# Patient Record
Sex: FEMALE | Race: WHITE | NOT HISPANIC OR LATINO
[De-identification: names, ages, dates, MRNs, and addresses within clinical notes are randomized per-mention and may not be internally consistent; named-entity substitution may affect disease eponyms.]

---

## 2017-09-07 ENCOUNTER — APPOINTMENT (OUTPATIENT)
Dept: ANTEPARTUM | Facility: CLINIC | Age: 37
End: 2017-09-07
Payer: COMMERCIAL

## 2017-09-07 ENCOUNTER — ASOB RESULT (OUTPATIENT)
Age: 37
End: 2017-09-07

## 2017-09-07 PROCEDURE — 99241 OFFICE CONSULTATION NEW/ESTAB PATIENT 15 MIN: CPT | Mod: 25

## 2017-09-07 PROCEDURE — 76817 TRANSVAGINAL US OBSTETRIC: CPT

## 2017-09-07 PROCEDURE — 76811 OB US DETAILED SNGL FETUS: CPT

## 2017-09-22 ENCOUNTER — ASOB RESULT (OUTPATIENT)
Age: 37
End: 2017-09-22

## 2017-09-22 ENCOUNTER — APPOINTMENT (OUTPATIENT)
Dept: ANTEPARTUM | Facility: CLINIC | Age: 37
End: 2017-09-22
Payer: COMMERCIAL

## 2017-09-22 PROCEDURE — 76816 OB US FOLLOW-UP PER FETUS: CPT

## 2017-09-22 PROCEDURE — 76817 TRANSVAGINAL US OBSTETRIC: CPT

## 2017-11-07 ENCOUNTER — APPOINTMENT (OUTPATIENT)
Dept: MATERNAL FETAL MEDICINE | Facility: CLINIC | Age: 37
End: 2017-11-07
Payer: COMMERCIAL

## 2017-11-07 DIAGNOSIS — O99.810 ABNORMAL GLUCOSE COMPLICATING PREGNANCY: ICD-10-CM

## 2017-11-07 PROCEDURE — G0109 DIAB MANAGE TRN IND/GROUP: CPT

## 2017-11-08 ENCOUNTER — MEDICATION RENEWAL (OUTPATIENT)
Age: 37
End: 2017-11-08

## 2017-11-08 ENCOUNTER — APPOINTMENT (OUTPATIENT)
Dept: MATERNAL FETAL MEDICINE | Facility: CLINIC | Age: 37
End: 2017-11-08
Payer: COMMERCIAL

## 2017-11-08 PROCEDURE — G0109 DIAB MANAGE TRN IND/GROUP: CPT

## 2017-11-08 RX ORDER — URINE ACETONE TEST STRIPS
STRIP MISCELLANEOUS
Qty: 1 | Refills: 1 | Status: ACTIVE | COMMUNITY
Start: 2017-11-07 | End: 1900-01-01

## 2017-11-15 ENCOUNTER — APPOINTMENT (OUTPATIENT)
Dept: MATERNAL FETAL MEDICINE | Facility: CLINIC | Age: 37
End: 2017-11-15
Payer: COMMERCIAL

## 2017-11-15 DIAGNOSIS — O24.414 GESTATIONAL DIABETES MELLITUS IN PREGNANCY, INSULIN CONTROLLED: ICD-10-CM

## 2017-11-15 PROCEDURE — G0108 DIAB MANAGE TRN  PER INDIV: CPT

## 2017-11-15 RX ORDER — ISOPROPYL ALCOHOL 70 ML/100ML
SWAB TOPICAL
Qty: 1 | Refills: 0 | Status: ACTIVE | COMMUNITY
Start: 2017-11-15 | End: 1900-01-01

## 2017-11-15 RX ORDER — INSULIN HUMAN 100 [IU]/ML
100 INJECTION, SUSPENSION SUBCUTANEOUS
Qty: 2 | Refills: 3 | Status: ACTIVE | COMMUNITY
Start: 2017-11-15 | End: 1900-01-01

## 2017-11-15 RX ORDER — PEN NEEDLE, DIABETIC 29 G X1/2"
32G X 4 MM NEEDLE, DISPOSABLE MISCELLANEOUS
Qty: 1 | Refills: 0 | Status: ACTIVE | COMMUNITY
Start: 2017-11-15 | End: 1900-01-01

## 2017-11-15 RX ORDER — INSULIN HUMAN 100 [IU]/ML
100 INJECTION, SUSPENSION SUBCUTANEOUS
Qty: 1 | Refills: 1 | Status: ACTIVE | COMMUNITY
Start: 2017-11-15 | End: 1900-01-01

## 2017-11-15 RX ORDER — SYRING-NEEDL,DISP,INSUL,0.3 ML 31GX15/64"
31G X 15/64" SYRINGE, EMPTY DISPOSABLE MISCELLANEOUS
Qty: 1 | Refills: 1 | Status: ACTIVE | COMMUNITY
Start: 2017-11-15 | End: 1900-01-01

## 2017-11-24 ENCOUNTER — OUTPATIENT (OUTPATIENT)
Dept: OUTPATIENT SERVICES | Facility: HOSPITAL | Age: 37
LOS: 1 days | End: 2017-11-24
Payer: COMMERCIAL

## 2017-11-24 DIAGNOSIS — Z3A.00 WEEKS OF GESTATION OF PREGNANCY NOT SPECIFIED: ICD-10-CM

## 2017-11-24 DIAGNOSIS — O26.899 OTHER SPECIFIED PREGNANCY RELATED CONDITIONS, UNSPECIFIED TRIMESTER: ICD-10-CM

## 2017-11-24 LAB
APPEARANCE UR: CLEAR — SIGNIFICANT CHANGE UP
BILIRUB UR-MCNC: NEGATIVE — SIGNIFICANT CHANGE UP
COLOR SPEC: SIGNIFICANT CHANGE UP
DIFF PNL FLD: NEGATIVE — SIGNIFICANT CHANGE UP
GLUCOSE BLDC GLUCOMTR-MCNC: 94 MG/DL — SIGNIFICANT CHANGE UP (ref 70–99)
GLUCOSE UR QL: NEGATIVE — SIGNIFICANT CHANGE UP
KETONES UR-MCNC: ABNORMAL
LEUKOCYTE ESTERASE UR-ACNC: NEGATIVE — SIGNIFICANT CHANGE UP
NITRITE UR-MCNC: NEGATIVE — SIGNIFICANT CHANGE UP
PH UR: 6.5 — SIGNIFICANT CHANGE UP (ref 5–8)
PROT UR-MCNC: NEGATIVE — SIGNIFICANT CHANGE UP
SP GR SPEC: 1.01 — SIGNIFICANT CHANGE UP (ref 1.01–1.02)
UROBILINOGEN FLD QL: NEGATIVE — SIGNIFICANT CHANGE UP

## 2017-11-24 PROCEDURE — G0463: CPT

## 2017-11-24 PROCEDURE — 59025 FETAL NON-STRESS TEST: CPT

## 2017-11-24 PROCEDURE — 82962 GLUCOSE BLOOD TEST: CPT

## 2017-11-24 PROCEDURE — 81003 URINALYSIS AUTO W/O SCOPE: CPT

## 2017-11-24 RX ORDER — SODIUM CHLORIDE 9 MG/ML
1000 INJECTION, SOLUTION INTRAVENOUS ONCE
Qty: 0 | Refills: 0 | Status: COMPLETED | OUTPATIENT
Start: 2017-11-24 | End: 2017-11-24

## 2017-11-24 RX ADMIN — SODIUM CHLORIDE 1000 MILLILITER(S): 9 INJECTION, SOLUTION INTRAVENOUS at 18:00

## 2017-11-24 RX ADMIN — Medication 0.25 MILLIGRAM(S): at 18:18

## 2017-11-29 ENCOUNTER — RX RENEWAL (OUTPATIENT)
Age: 37
End: 2017-11-29

## 2017-11-29 RX ORDER — BLOOD SUGAR DIAGNOSTIC
STRIP MISCELLANEOUS
Qty: 200 | Refills: 2 | Status: ACTIVE | COMMUNITY
Start: 2017-11-07 | End: 1900-01-01

## 2017-11-30 ENCOUNTER — OTHER (OUTPATIENT)
Age: 37
End: 2017-11-30

## 2017-11-30 ENCOUNTER — APPOINTMENT (OUTPATIENT)
Dept: MATERNAL FETAL MEDICINE | Facility: CLINIC | Age: 37
End: 2017-11-30

## 2017-12-14 ENCOUNTER — INPATIENT (INPATIENT)
Facility: HOSPITAL | Age: 37
LOS: 4 days | Discharge: HOME | End: 2017-12-19
Attending: OBSTETRICS & GYNECOLOGY | Admitting: OBSTETRICS & GYNECOLOGY
Payer: COMMERCIAL

## 2017-12-14 VITALS — HEIGHT: 60 IN | WEIGHT: 149.91 LBS

## 2017-12-14 DIAGNOSIS — O26.899 OTHER SPECIFIED PREGNANCY RELATED CONDITIONS, UNSPECIFIED TRIMESTER: ICD-10-CM

## 2017-12-14 DIAGNOSIS — Z34.80 ENCOUNTER FOR SUPERVISION OF OTHER NORMAL PREGNANCY, UNSPECIFIED TRIMESTER: ICD-10-CM

## 2017-12-14 DIAGNOSIS — Z3A.00 WEEKS OF GESTATION OF PREGNANCY NOT SPECIFIED: ICD-10-CM

## 2017-12-14 LAB
BASOPHILS # BLD AUTO: 0.1 K/UL — SIGNIFICANT CHANGE UP (ref 0–0.2)
BASOPHILS NFR BLD AUTO: 0.4 % — SIGNIFICANT CHANGE UP (ref 0–2)
BLD GP AB SCN SERPL QL: NEGATIVE — SIGNIFICANT CHANGE UP
EOSINOPHIL # BLD AUTO: 0.2 K/UL — SIGNIFICANT CHANGE UP (ref 0–0.5)
EOSINOPHIL NFR BLD AUTO: 1.4 % — SIGNIFICANT CHANGE UP (ref 0–6)
GLUCOSE BLDC GLUCOMTR-MCNC: 113 MG/DL — HIGH (ref 70–99)
GLUCOSE BLDC GLUCOMTR-MCNC: 117 MG/DL — HIGH (ref 70–99)
HCT VFR BLD CALC: 34.4 % — LOW (ref 34.5–45)
HGB BLD-MCNC: 12.3 G/DL — SIGNIFICANT CHANGE UP (ref 11.5–15.5)
LYMPHOCYTES # BLD AUTO: 17.2 % — SIGNIFICANT CHANGE UP (ref 13–44)
LYMPHOCYTES # BLD AUTO: 2 K/UL — SIGNIFICANT CHANGE UP (ref 1–3.3)
MCHC RBC-ENTMCNC: 33.5 PG — SIGNIFICANT CHANGE UP (ref 27–34)
MCHC RBC-ENTMCNC: 35.7 GM/DL — SIGNIFICANT CHANGE UP (ref 32–36)
MCV RBC AUTO: 93.8 FL — SIGNIFICANT CHANGE UP (ref 80–100)
MONOCYTES # BLD AUTO: 0.9 K/UL — SIGNIFICANT CHANGE UP (ref 0–0.9)
MONOCYTES NFR BLD AUTO: 7.6 % — SIGNIFICANT CHANGE UP (ref 2–14)
NEUTROPHILS # BLD AUTO: 8.4 K/UL — HIGH (ref 1.8–7.4)
NEUTROPHILS NFR BLD AUTO: 73.4 % — SIGNIFICANT CHANGE UP (ref 43–77)
PLATELET # BLD AUTO: 188 K/UL — SIGNIFICANT CHANGE UP (ref 150–400)
RBC # BLD: 3.67 M/UL — LOW (ref 3.8–5.2)
RBC # FLD: 12.5 % — SIGNIFICANT CHANGE UP (ref 10.3–14.5)
RH IG SCN BLD-IMP: POSITIVE — SIGNIFICANT CHANGE UP
WBC # BLD: 11.5 K/UL — HIGH (ref 3.8–10.5)
WBC # FLD AUTO: 11.5 K/UL — HIGH (ref 3.8–10.5)

## 2017-12-14 RX ORDER — SODIUM CHLORIDE 9 MG/ML
500 INJECTION INTRAMUSCULAR; INTRAVENOUS; SUBCUTANEOUS ONCE
Qty: 0 | Refills: 0 | Status: COMPLETED | OUTPATIENT
Start: 2017-12-14 | End: 2017-12-14

## 2017-12-14 RX ORDER — AZITHROMYCIN 500 MG/1
1000 TABLET, FILM COATED ORAL ONCE
Qty: 0 | Refills: 0 | Status: COMPLETED | OUTPATIENT
Start: 2017-12-14 | End: 2017-12-14

## 2017-12-14 RX ORDER — MAGNESIUM SULFATE 500 MG/ML
2 VIAL (ML) INJECTION
Qty: 40 | Refills: 0 | Status: DISCONTINUED | OUTPATIENT
Start: 2017-12-14 | End: 2017-12-15

## 2017-12-14 RX ORDER — SODIUM CHLORIDE 9 MG/ML
1000 INJECTION INTRAMUSCULAR; INTRAVENOUS; SUBCUTANEOUS
Qty: 0 | Refills: 0 | Status: DISCONTINUED | OUTPATIENT
Start: 2017-12-14 | End: 2017-12-15

## 2017-12-14 RX ORDER — AMPICILLIN TRIHYDRATE 250 MG
CAPSULE ORAL
Qty: 0 | Refills: 0 | Status: DISCONTINUED | OUTPATIENT
Start: 2017-12-14 | End: 2017-12-15

## 2017-12-14 RX ORDER — AMPICILLIN TRIHYDRATE 250 MG
2 CAPSULE ORAL ONCE
Qty: 0 | Refills: 0 | Status: COMPLETED | OUTPATIENT
Start: 2017-12-14 | End: 2017-12-14

## 2017-12-14 RX ORDER — MAGNESIUM SULFATE 500 MG/ML
4 VIAL (ML) INJECTION ONCE
Qty: 0 | Refills: 0 | Status: COMPLETED | OUTPATIENT
Start: 2017-12-14 | End: 2017-12-14

## 2017-12-14 RX ORDER — DEXTROSE 50 % IN WATER 50 %
12.5 SYRINGE (ML) INTRAVENOUS ONCE
Qty: 0 | Refills: 0 | Status: DISCONTINUED | OUTPATIENT
Start: 2017-12-14 | End: 2017-12-15

## 2017-12-14 RX ORDER — DEXTROSE 50 % IN WATER 50 %
25 SYRINGE (ML) INTRAVENOUS ONCE
Qty: 0 | Refills: 0 | Status: DISCONTINUED | OUTPATIENT
Start: 2017-12-14 | End: 2017-12-15

## 2017-12-14 RX ORDER — HUMAN INSULIN 100 [IU]/ML
32 INJECTION, SUSPENSION SUBCUTANEOUS AT BEDTIME
Qty: 0 | Refills: 0 | Status: DISCONTINUED | OUTPATIENT
Start: 2017-12-14 | End: 2017-12-15

## 2017-12-14 RX ORDER — AMPICILLIN TRIHYDRATE 250 MG
2 CAPSULE ORAL EVERY 6 HOURS
Qty: 0 | Refills: 0 | Status: DISCONTINUED | OUTPATIENT
Start: 2017-12-15 | End: 2017-12-15

## 2017-12-14 RX ORDER — SODIUM CHLORIDE 0.65 %
1 AEROSOL, SPRAY (ML) NASAL DAILY
Qty: 0 | Refills: 0 | Status: DISCONTINUED | OUTPATIENT
Start: 2017-12-14 | End: 2017-12-19

## 2017-12-14 RX ORDER — DEXTROSE 50 % IN WATER 50 %
1 SYRINGE (ML) INTRAVENOUS ONCE
Qty: 0 | Refills: 0 | Status: DISCONTINUED | OUTPATIENT
Start: 2017-12-14 | End: 2017-12-15

## 2017-12-14 RX ORDER — SODIUM CHLORIDE 9 MG/ML
1000 INJECTION, SOLUTION INTRAVENOUS
Qty: 0 | Refills: 0 | Status: DISCONTINUED | OUTPATIENT
Start: 2017-12-14 | End: 2017-12-15

## 2017-12-14 RX ORDER — GLUCAGON INJECTION, SOLUTION 0.5 MG/.1ML
1 INJECTION, SOLUTION SUBCUTANEOUS ONCE
Qty: 0 | Refills: 0 | Status: DISCONTINUED | OUTPATIENT
Start: 2017-12-14 | End: 2017-12-15

## 2017-12-14 RX ADMIN — SODIUM CHLORIDE 1000 MILLILITER(S): 9 INJECTION INTRAMUSCULAR; INTRAVENOUS; SUBCUTANEOUS at 20:30

## 2017-12-14 RX ADMIN — Medication 216 GRAM(S): at 20:44

## 2017-12-14 RX ADMIN — Medication 300 GRAM(S): at 20:27

## 2017-12-14 RX ADMIN — AZITHROMYCIN 1000 MILLIGRAM(S): 500 TABLET, FILM COATED ORAL at 22:41

## 2017-12-14 RX ADMIN — HUMAN INSULIN 32 UNIT(S): 100 INJECTION, SUSPENSION SUBCUTANEOUS at 23:15

## 2017-12-14 RX ADMIN — Medication 1 SPRAY(S): at 22:45

## 2017-12-14 RX ADMIN — Medication 12 MILLIGRAM(S): at 20:21

## 2017-12-14 RX ADMIN — SODIUM CHLORIDE 50 MILLILITER(S): 9 INJECTION INTRAMUSCULAR; INTRAVENOUS; SUBCUTANEOUS at 20:54

## 2017-12-15 ENCOUNTER — RESULT REVIEW (OUTPATIENT)
Age: 37
End: 2017-12-15

## 2017-12-15 ENCOUNTER — APPOINTMENT (OUTPATIENT)
Dept: ANTEPARTUM | Facility: CLINIC | Age: 37
End: 2017-12-15

## 2017-12-15 ENCOUNTER — ASOB RESULT (OUTPATIENT)
Age: 37
End: 2017-12-15

## 2017-12-15 LAB
GLUCOSE BLDC GLUCOMTR-MCNC: 101 MG/DL — HIGH (ref 70–99)
GLUCOSE BLDC GLUCOMTR-MCNC: 113 MG/DL — HIGH (ref 70–99)
GLUCOSE BLDC GLUCOMTR-MCNC: 129 MG/DL — HIGH (ref 70–99)
GLUCOSE BLDC GLUCOMTR-MCNC: 95 MG/DL — SIGNIFICANT CHANGE UP (ref 70–99)
MAGNESIUM SERPL-MCNC: 4.8 MG/DL — HIGH (ref 1.6–2.6)
MAGNESIUM SERPL-MCNC: 6 MG/DL — HIGH (ref 1.6–2.6)
MAGNESIUM SERPL-MCNC: 6.7 MG/DL — HIGH (ref 1.6–2.6)
T PALLIDUM AB TITR SER: NEGATIVE — SIGNIFICANT CHANGE UP

## 2017-12-15 PROCEDURE — 76819 FETAL BIOPHYS PROFIL W/O NST: CPT | Mod: 26

## 2017-12-15 PROCEDURE — 76816 OB US FOLLOW-UP PER FETUS: CPT | Mod: 26

## 2017-12-15 PROCEDURE — 74000: CPT | Mod: 26

## 2017-12-15 RX ORDER — HYDROMORPHONE HYDROCHLORIDE 2 MG/ML
0.5 INJECTION INTRAMUSCULAR; INTRAVENOUS; SUBCUTANEOUS
Qty: 0 | Refills: 0 | Status: DISCONTINUED | OUTPATIENT
Start: 2017-12-15 | End: 2017-12-17

## 2017-12-15 RX ORDER — OXYCODONE HYDROCHLORIDE 5 MG/1
5 TABLET ORAL
Qty: 0 | Refills: 0 | Status: COMPLETED | OUTPATIENT
Start: 2017-12-15 | End: 2017-12-22

## 2017-12-15 RX ORDER — DOCUSATE SODIUM 100 MG
100 CAPSULE ORAL
Qty: 0 | Refills: 0 | Status: DISCONTINUED | OUTPATIENT
Start: 2017-12-15 | End: 2017-12-16

## 2017-12-15 RX ORDER — SODIUM CHLORIDE 9 MG/ML
1000 INJECTION, SOLUTION INTRAVENOUS
Qty: 0 | Refills: 0 | Status: DISCONTINUED | OUTPATIENT
Start: 2017-12-15 | End: 2017-12-19

## 2017-12-15 RX ORDER — OXYCODONE HYDROCHLORIDE 5 MG/1
5 TABLET ORAL EVERY 4 HOURS
Qty: 0 | Refills: 0 | Status: COMPLETED | OUTPATIENT
Start: 2017-12-15 | End: 2017-12-22

## 2017-12-15 RX ORDER — NALOXONE HYDROCHLORIDE 4 MG/.1ML
0.1 SPRAY NASAL
Qty: 0 | Refills: 0 | Status: DISCONTINUED | OUTPATIENT
Start: 2017-12-15 | End: 2017-12-17

## 2017-12-15 RX ORDER — TETANUS TOXOID, REDUCED DIPHTHERIA TOXOID AND ACELLULAR PERTUSSIS VACCINE, ADSORBED 5; 2.5; 8; 8; 2.5 [IU]/.5ML; [IU]/.5ML; UG/.5ML; UG/.5ML; UG/.5ML
0.5 SUSPENSION INTRAMUSCULAR ONCE
Qty: 0 | Refills: 0 | Status: DISCONTINUED | OUTPATIENT
Start: 2017-12-15 | End: 2017-12-19

## 2017-12-15 RX ORDER — ONDANSETRON 8 MG/1
4 TABLET, FILM COATED ORAL EVERY 6 HOURS
Qty: 0 | Refills: 0 | Status: DISCONTINUED | OUTPATIENT
Start: 2017-12-15 | End: 2017-12-17

## 2017-12-15 RX ORDER — SODIUM CHLORIDE 9 MG/ML
1000 INJECTION, SOLUTION INTRAVENOUS
Qty: 0 | Refills: 0 | Status: DISCONTINUED | OUTPATIENT
Start: 2017-12-15 | End: 2017-12-15

## 2017-12-15 RX ORDER — HEPARIN SODIUM 5000 [USP'U]/ML
5000 INJECTION INTRAVENOUS; SUBCUTANEOUS EVERY 12 HOURS
Qty: 0 | Refills: 0 | Status: DISCONTINUED | OUTPATIENT
Start: 2017-12-15 | End: 2017-12-19

## 2017-12-15 RX ORDER — HYDROMORPHONE HYDROCHLORIDE 2 MG/ML
0.25 INJECTION INTRAMUSCULAR; INTRAVENOUS; SUBCUTANEOUS
Qty: 0 | Refills: 0 | Status: DISCONTINUED | OUTPATIENT
Start: 2017-12-15 | End: 2017-12-19

## 2017-12-15 RX ORDER — OXYTOCIN 10 UNIT/ML
41.67 VIAL (ML) INJECTION
Qty: 20 | Refills: 0 | Status: DISCONTINUED | OUTPATIENT
Start: 2017-12-15 | End: 2017-12-19

## 2017-12-15 RX ORDER — KETOROLAC TROMETHAMINE 30 MG/ML
30 SYRINGE (ML) INJECTION EVERY 6 HOURS
Qty: 0 | Refills: 0 | Status: DISCONTINUED | OUTPATIENT
Start: 2017-12-15 | End: 2017-12-17

## 2017-12-15 RX ORDER — FERROUS SULFATE 325(65) MG
325 TABLET ORAL DAILY
Qty: 0 | Refills: 0 | Status: DISCONTINUED | OUTPATIENT
Start: 2017-12-15 | End: 2017-12-16

## 2017-12-15 RX ORDER — LANOLIN
1 OINTMENT (GRAM) TOPICAL
Qty: 0 | Refills: 0 | Status: DISCONTINUED | OUTPATIENT
Start: 2017-12-15 | End: 2017-12-19

## 2017-12-15 RX ORDER — HYDROMORPHONE HYDROCHLORIDE 2 MG/ML
30 INJECTION INTRAMUSCULAR; INTRAVENOUS; SUBCUTANEOUS
Qty: 0 | Refills: 0 | Status: DISCONTINUED | OUTPATIENT
Start: 2017-12-15 | End: 2017-12-17

## 2017-12-15 RX ORDER — ACETAMINOPHEN 500 MG
975 TABLET ORAL EVERY 6 HOURS
Qty: 0 | Refills: 0 | Status: DISCONTINUED | OUTPATIENT
Start: 2017-12-15 | End: 2017-12-19

## 2017-12-15 RX ORDER — OXYTOCIN 10 UNIT/ML
333.33 VIAL (ML) INJECTION
Qty: 20 | Refills: 0 | Status: DISCONTINUED | OUTPATIENT
Start: 2017-12-15 | End: 2017-12-19

## 2017-12-15 RX ORDER — SIMETHICONE 80 MG/1
80 TABLET, CHEWABLE ORAL EVERY 4 HOURS
Qty: 0 | Refills: 0 | Status: DISCONTINUED | OUTPATIENT
Start: 2017-12-15 | End: 2017-12-19

## 2017-12-15 RX ORDER — DIPHENHYDRAMINE HCL 50 MG
25 CAPSULE ORAL EVERY 6 HOURS
Qty: 0 | Refills: 0 | Status: DISCONTINUED | OUTPATIENT
Start: 2017-12-15 | End: 2017-12-19

## 2017-12-15 RX ORDER — IBUPROFEN 200 MG
600 TABLET ORAL EVERY 6 HOURS
Qty: 0 | Refills: 0 | Status: COMPLETED | OUTPATIENT
Start: 2017-12-15 | End: 2018-11-13

## 2017-12-15 RX ORDER — GLYCERIN ADULT
1 SUPPOSITORY, RECTAL RECTAL AT BEDTIME
Qty: 0 | Refills: 0 | Status: DISCONTINUED | OUTPATIENT
Start: 2017-12-15 | End: 2017-12-19

## 2017-12-15 RX ADMIN — SIMETHICONE 80 MILLIGRAM(S): 80 TABLET, CHEWABLE ORAL at 22:02

## 2017-12-15 RX ADMIN — SODIUM CHLORIDE 125 MILLILITER(S): 9 INJECTION, SOLUTION INTRAVENOUS at 14:05

## 2017-12-15 RX ADMIN — Medication 216 GRAM(S): at 09:09

## 2017-12-15 RX ADMIN — Medication 100 MILLIGRAM(S): at 22:02

## 2017-12-15 RX ADMIN — Medication 30 MILLIGRAM(S): at 23:00

## 2017-12-15 RX ADMIN — HYDROMORPHONE HYDROCHLORIDE 30 MILLILITER(S): 2 INJECTION INTRAMUSCULAR; INTRAVENOUS; SUBCUTANEOUS at 19:35

## 2017-12-15 RX ADMIN — SODIUM CHLORIDE 125 MILLILITER(S): 9 INJECTION, SOLUTION INTRAVENOUS at 21:26

## 2017-12-15 RX ADMIN — Medication 975 MILLIGRAM(S): at 22:01

## 2017-12-15 RX ADMIN — Medication 216 GRAM(S): at 03:14

## 2017-12-15 RX ADMIN — Medication 30 MILLIGRAM(S): at 22:00

## 2017-12-15 RX ADMIN — Medication 975 MILLIGRAM(S): at 23:00

## 2017-12-16 LAB
BASOPHILS # BLD AUTO: 0 K/UL — SIGNIFICANT CHANGE UP (ref 0–0.2)
BASOPHILS NFR BLD AUTO: 0.3 % — SIGNIFICANT CHANGE UP (ref 0–2)
EOSINOPHIL # BLD AUTO: 0 K/UL — SIGNIFICANT CHANGE UP (ref 0–0.5)
EOSINOPHIL NFR BLD AUTO: 0.3 % — SIGNIFICANT CHANGE UP (ref 0–6)
GROUP B BETA STREP DNA (PCR): SIGNIFICANT CHANGE UP
GROUP B BETA STREP INTERPRETATION: SIGNIFICANT CHANGE UP
HCT VFR BLD CALC: 24.9 % — LOW (ref 34.5–45)
HGB BLD-MCNC: 8.7 G/DL — LOW (ref 11.5–15.5)
LYMPHOCYTES # BLD AUTO: 1.3 K/UL — SIGNIFICANT CHANGE UP (ref 1–3.3)
LYMPHOCYTES # BLD AUTO: 10.8 % — LOW (ref 13–44)
MCHC RBC-ENTMCNC: 33.5 PG — SIGNIFICANT CHANGE UP (ref 27–34)
MCHC RBC-ENTMCNC: 35 GM/DL — SIGNIFICANT CHANGE UP (ref 32–36)
MCV RBC AUTO: 95.9 FL — SIGNIFICANT CHANGE UP (ref 80–100)
MONOCYTES # BLD AUTO: 0.8 K/UL — SIGNIFICANT CHANGE UP (ref 0–0.9)
MONOCYTES NFR BLD AUTO: 6.8 % — SIGNIFICANT CHANGE UP (ref 2–14)
NEUTROPHILS # BLD AUTO: 9.7 K/UL — HIGH (ref 1.8–7.4)
NEUTROPHILS NFR BLD AUTO: 81.9 % — HIGH (ref 43–77)
PLATELET # BLD AUTO: 185 K/UL — SIGNIFICANT CHANGE UP (ref 150–400)
RBC # BLD: 2.6 M/UL — LOW (ref 3.8–5.2)
RBC # FLD: 12.8 % — SIGNIFICANT CHANGE UP (ref 10.3–14.5)
SOURCE GROUP B STREP: SIGNIFICANT CHANGE UP
WBC # BLD: 11.9 K/UL — HIGH (ref 3.8–10.5)
WBC # FLD AUTO: 11.9 K/UL — HIGH (ref 3.8–10.5)

## 2017-12-16 RX ORDER — DOCUSATE SODIUM 100 MG
100 CAPSULE ORAL THREE TIMES A DAY
Qty: 0 | Refills: 0 | Status: DISCONTINUED | OUTPATIENT
Start: 2017-12-16 | End: 2017-12-19

## 2017-12-16 RX ORDER — ASCORBIC ACID 60 MG
250 TABLET,CHEWABLE ORAL THREE TIMES A DAY
Qty: 0 | Refills: 0 | Status: DISCONTINUED | OUTPATIENT
Start: 2017-12-16 | End: 2017-12-19

## 2017-12-16 RX ORDER — FERROUS SULFATE 325(65) MG
325 TABLET ORAL
Qty: 0 | Refills: 0 | Status: DISCONTINUED | OUTPATIENT
Start: 2017-12-16 | End: 2017-12-19

## 2017-12-16 RX ORDER — IBUPROFEN 200 MG
600 TABLET ORAL EVERY 6 HOURS
Qty: 0 | Refills: 0 | Status: DISCONTINUED | OUTPATIENT
Start: 2017-12-16 | End: 2017-12-19

## 2017-12-16 RX ADMIN — SIMETHICONE 80 MILLIGRAM(S): 80 TABLET, CHEWABLE ORAL at 14:03

## 2017-12-16 RX ADMIN — Medication 975 MILLIGRAM(S): at 03:52

## 2017-12-16 RX ADMIN — Medication 100 MILLIGRAM(S): at 18:20

## 2017-12-16 RX ADMIN — Medication 975 MILLIGRAM(S): at 18:20

## 2017-12-16 RX ADMIN — Medication 975 MILLIGRAM(S): at 11:22

## 2017-12-16 RX ADMIN — Medication 1 SUPPOSITORY(S): at 22:44

## 2017-12-16 RX ADMIN — Medication 30 MILLIGRAM(S): at 11:25

## 2017-12-16 RX ADMIN — Medication 30 MILLIGRAM(S): at 03:54

## 2017-12-16 RX ADMIN — Medication 100 MILLIGRAM(S): at 22:44

## 2017-12-16 RX ADMIN — HEPARIN SODIUM 5000 UNIT(S): 5000 INJECTION INTRAVENOUS; SUBCUTANEOUS at 18:17

## 2017-12-16 RX ADMIN — Medication 1 TABLET(S): at 11:24

## 2017-12-16 RX ADMIN — Medication 30 MILLIGRAM(S): at 18:19

## 2017-12-16 RX ADMIN — HEPARIN SODIUM 5000 UNIT(S): 5000 INJECTION INTRAVENOUS; SUBCUTANEOUS at 03:54

## 2017-12-16 RX ADMIN — Medication 975 MILLIGRAM(S): at 05:06

## 2017-12-16 RX ADMIN — SIMETHICONE 80 MILLIGRAM(S): 80 TABLET, CHEWABLE ORAL at 18:23

## 2017-12-16 RX ADMIN — Medication 325 MILLIGRAM(S): at 11:23

## 2017-12-16 RX ADMIN — Medication 30 MILLIGRAM(S): at 11:24

## 2017-12-16 RX ADMIN — Medication 100 MILLIGRAM(S): at 11:25

## 2017-12-16 RX ADMIN — Medication 30 MILLIGRAM(S): at 05:06

## 2017-12-16 RX ADMIN — Medication 250 MILLIGRAM(S): at 22:44

## 2017-12-16 NOTE — DIETITIAN INITIAL EVALUATION ADULT. - NS AS NUTRI INTERV ED CONTENT
Educated patient on need to follow-up for 2-hour oral glucose tolerance test 4-12 weeks after delivery. Advised patient that she no longer needs to check fingersticks at home but that she is at increased risk for developing T2DM due to GDM. Encouraged gradual wt loss with daily physical activity when medically feasible. Explained that breastfeeding decreases the risk for developing T2DM. Pt encouraged to continue prenatal MVI while breastfeeding and that she will require more protein/calories while breastfeeding. Encouraged pt to liberalize diet but continue to include protein with carbohydrates during meals/snacks.

## 2017-12-16 NOTE — DIETITIAN INITIAL EVALUATION ADULT. - ADHERENCE
good/Patient diagnosed with GDM during this pregnancy, stated she was not diagnosed with GDM during previous pregnancies. Reported taking 16units Novolin @ bed for BG management and was checking fingersticks daily.

## 2017-12-16 NOTE — DIETITIAN INITIAL EVALUATION ADULT. - PERTINENT MEDS FT
lactated ringers @ 125cc/hr, colace, ferrous sulfate, glycerin, dilaudid, zofran, oxycodone, prenatal MVI, mylicon

## 2017-12-16 NOTE — DIETITIAN INITIAL EVALUATION ADULT. - OTHER INFO
Nutrition consult received for GDM. [id number] female who delivered at 33.5 weeks gestation, infant currently in NICU. Patient taking prenatal MVI during pregnancy. Patient plans to exclusively , encouraged continuation of prenatal MVI while breastfeeding. Followed by maternal fetal medicine during pregnancy. Reports good appetite & PO intake during admission. NKFA noted. No BM since admission

## 2017-12-16 NOTE — DIETITIAN INITIAL EVALUATION ADULT. - ENERGY NEEDS
Ht 60 inches Pre-Pregnancy Wt 135 pounds BMI 26.4 Kg/m^2   pounds +/- 10%; 135% IBW  No edema, skin intact

## 2017-12-16 NOTE — DIETITIAN INITIAL EVALUATION ADULT. - NS FNS REASON FOR WEIGHT CHANG
Patient reported pre-pregnancy weight of 135 pounds with weight gain of 17 pounds to current weight of 152 pounds during pregnancy

## 2017-12-17 RX ORDER — MAGNESIUM HYDROXIDE 400 MG/1
30 TABLET, CHEWABLE ORAL DAILY
Qty: 0 | Refills: 0 | Status: DISCONTINUED | OUTPATIENT
Start: 2017-12-17 | End: 2017-12-19

## 2017-12-17 RX ORDER — OXYCODONE HYDROCHLORIDE 5 MG/1
5 TABLET ORAL
Qty: 0 | Refills: 0 | Status: DISCONTINUED | OUTPATIENT
Start: 2017-12-17 | End: 2017-12-19

## 2017-12-17 RX ORDER — OXYCODONE HYDROCHLORIDE 5 MG/1
5 TABLET ORAL EVERY 4 HOURS
Qty: 0 | Refills: 0 | Status: DISCONTINUED | OUTPATIENT
Start: 2017-12-17 | End: 2017-12-19

## 2017-12-17 RX ADMIN — Medication 100 MILLIGRAM(S): at 22:29

## 2017-12-17 RX ADMIN — Medication 975 MILLIGRAM(S): at 06:13

## 2017-12-17 RX ADMIN — Medication 975 MILLIGRAM(S): at 12:10

## 2017-12-17 RX ADMIN — SIMETHICONE 80 MILLIGRAM(S): 80 TABLET, CHEWABLE ORAL at 05:13

## 2017-12-17 RX ADMIN — Medication 600 MILLIGRAM(S): at 18:40

## 2017-12-17 RX ADMIN — SIMETHICONE 80 MILLIGRAM(S): 80 TABLET, CHEWABLE ORAL at 11:37

## 2017-12-17 RX ADMIN — Medication 600 MILLIGRAM(S): at 06:51

## 2017-12-17 RX ADMIN — MAGNESIUM HYDROXIDE 30 MILLILITER(S): 400 TABLET, CHEWABLE ORAL at 08:19

## 2017-12-17 RX ADMIN — Medication 975 MILLIGRAM(S): at 19:05

## 2017-12-17 RX ADMIN — Medication 100 MILLIGRAM(S): at 16:00

## 2017-12-17 RX ADMIN — Medication 975 MILLIGRAM(S): at 06:51

## 2017-12-17 RX ADMIN — HEPARIN SODIUM 5000 UNIT(S): 5000 INJECTION INTRAVENOUS; SUBCUTANEOUS at 18:41

## 2017-12-17 RX ADMIN — Medication 250 MILLIGRAM(S): at 05:07

## 2017-12-17 RX ADMIN — Medication 1 SUPPOSITORY(S): at 12:10

## 2017-12-17 RX ADMIN — Medication 600 MILLIGRAM(S): at 11:37

## 2017-12-17 RX ADMIN — SIMETHICONE 80 MILLIGRAM(S): 80 TABLET, CHEWABLE ORAL at 00:59

## 2017-12-17 RX ADMIN — Medication 975 MILLIGRAM(S): at 11:37

## 2017-12-17 RX ADMIN — Medication 600 MILLIGRAM(S): at 00:59

## 2017-12-17 RX ADMIN — Medication 100 MILLIGRAM(S): at 05:07

## 2017-12-17 RX ADMIN — Medication 250 MILLIGRAM(S): at 16:01

## 2017-12-17 RX ADMIN — Medication 975 MILLIGRAM(S): at 18:41

## 2017-12-17 RX ADMIN — Medication 600 MILLIGRAM(S): at 01:38

## 2017-12-17 RX ADMIN — Medication 600 MILLIGRAM(S): at 19:05

## 2017-12-17 RX ADMIN — Medication 975 MILLIGRAM(S): at 01:38

## 2017-12-17 RX ADMIN — Medication 1 TABLET(S): at 16:01

## 2017-12-17 RX ADMIN — Medication 600 MILLIGRAM(S): at 06:13

## 2017-12-17 RX ADMIN — Medication 250 MILLIGRAM(S): at 22:29

## 2017-12-17 RX ADMIN — Medication 975 MILLIGRAM(S): at 00:59

## 2017-12-17 RX ADMIN — SIMETHICONE 80 MILLIGRAM(S): 80 TABLET, CHEWABLE ORAL at 16:01

## 2017-12-17 RX ADMIN — HEPARIN SODIUM 5000 UNIT(S): 5000 INJECTION INTRAVENOUS; SUBCUTANEOUS at 05:07

## 2017-12-17 RX ADMIN — Medication 600 MILLIGRAM(S): at 12:10

## 2017-12-17 NOTE — PROVIDER CONTACT NOTE (EICU) - ASSESSMENT
pt with vital signs stable; hemoglobin and hematocrit stable; pt denies SOB, dizziness or lightheadedness; lochia light , fundus firm , 2 fingers below umbilicus

## 2017-12-17 NOTE — PROVIDER CONTACT NOTE (EICU) - RECOMMENDATIONS
pt with constipation relieved and is reporting greatly increased comfort and effective pain management at this time

## 2017-12-18 LAB
BASOPHILS # BLD AUTO: 0 K/UL — SIGNIFICANT CHANGE UP (ref 0–0.2)
BASOPHILS NFR BLD AUTO: 0.2 % — SIGNIFICANT CHANGE UP (ref 0–2)
EOSINOPHIL # BLD AUTO: 0.2 K/UL — SIGNIFICANT CHANGE UP (ref 0–0.5)
EOSINOPHIL NFR BLD AUTO: 1.5 % — SIGNIFICANT CHANGE UP (ref 0–6)
HCT VFR BLD CALC: 26.2 % — LOW (ref 34.5–45)
HGB BLD-MCNC: 8.9 G/DL — LOW (ref 11.5–15.5)
LYMPHOCYTES # BLD AUTO: 2.2 K/UL — SIGNIFICANT CHANGE UP (ref 1–3.3)
LYMPHOCYTES # BLD AUTO: 21.4 % — SIGNIFICANT CHANGE UP (ref 13–44)
MCHC RBC-ENTMCNC: 32.7 PG — SIGNIFICANT CHANGE UP (ref 27–34)
MCHC RBC-ENTMCNC: 33.8 GM/DL — SIGNIFICANT CHANGE UP (ref 32–36)
MCV RBC AUTO: 96.8 FL — SIGNIFICANT CHANGE UP (ref 80–100)
MONOCYTES # BLD AUTO: 0.6 K/UL — SIGNIFICANT CHANGE UP (ref 0–0.9)
MONOCYTES NFR BLD AUTO: 5.8 % — SIGNIFICANT CHANGE UP (ref 2–14)
NEUTROPHILS # BLD AUTO: 7.3 K/UL — SIGNIFICANT CHANGE UP (ref 1.8–7.4)
NEUTROPHILS NFR BLD AUTO: 71.1 % — SIGNIFICANT CHANGE UP (ref 43–77)
PLATELET # BLD AUTO: 212 K/UL — SIGNIFICANT CHANGE UP (ref 150–400)
RBC # BLD: 2.71 M/UL — LOW (ref 3.8–5.2)
RBC # FLD: 12.7 % — SIGNIFICANT CHANGE UP (ref 10.3–14.5)
WBC # BLD: 10.3 K/UL — SIGNIFICANT CHANGE UP (ref 3.8–10.5)
WBC # FLD AUTO: 10.3 K/UL — SIGNIFICANT CHANGE UP (ref 3.8–10.5)

## 2017-12-18 RX ADMIN — Medication 600 MILLIGRAM(S): at 00:21

## 2017-12-18 RX ADMIN — Medication 975 MILLIGRAM(S): at 17:26

## 2017-12-18 RX ADMIN — Medication 975 MILLIGRAM(S): at 23:48

## 2017-12-18 RX ADMIN — Medication 975 MILLIGRAM(S): at 06:23

## 2017-12-18 RX ADMIN — Medication 975 MILLIGRAM(S): at 00:21

## 2017-12-18 RX ADMIN — Medication 975 MILLIGRAM(S): at 11:39

## 2017-12-18 RX ADMIN — Medication 600 MILLIGRAM(S): at 17:26

## 2017-12-18 RX ADMIN — HEPARIN SODIUM 5000 UNIT(S): 5000 INJECTION INTRAVENOUS; SUBCUTANEOUS at 06:23

## 2017-12-18 RX ADMIN — Medication 975 MILLIGRAM(S): at 01:16

## 2017-12-18 RX ADMIN — Medication 600 MILLIGRAM(S): at 11:40

## 2017-12-18 RX ADMIN — Medication 600 MILLIGRAM(S): at 06:23

## 2017-12-18 RX ADMIN — Medication 600 MILLIGRAM(S): at 23:48

## 2017-12-18 RX ADMIN — Medication 600 MILLIGRAM(S): at 01:17

## 2017-12-18 RX ADMIN — Medication 1 TABLET(S): at 11:40

## 2017-12-18 RX ADMIN — Medication 250 MILLIGRAM(S): at 06:23

## 2017-12-18 RX ADMIN — Medication 100 MILLIGRAM(S): at 06:23

## 2017-12-18 NOTE — PROGRESS NOTE ADULT - ATTENDING COMMENTS
Agree with assessment and plan. Pt doing well without complaints.   Vitals stable. Exam Benign  - Routine post partum care
Agree with assessment and plan. Pt doing well without complaints.   Vitals stable. Exam Benign  - Routine post partum care
Agree with assessment and plan. Pt doing well without complaints.   Vitals stable. Exam Benign  - assymptomatic tachycardia. H/H stable, no focal symptoms.Will observe  - Routine post partum care

## 2017-12-18 NOTE — LACTATION INITIAL EVALUATION - INTERVENTION OUTCOME
verbalizes understanding/demonstrates understanding of teaching/needs met/Mother had just finished pumping and declined future observation

## 2017-12-18 NOTE — CHART NOTE - NSCHARTNOTEFT_GEN_A_CORE
Patient is 37y  status post rLTCS under general anesthesia due to PPROM at 33+4wks with prenatal course complicated by GDMA2.     Patient seen and evaluated for tachycardia of 110 as notified by RN. On exam patient was asymptomatic and without complaints with HR palpated of 104. Denies palpitations, CP, SOB, dyspnea on exertion, dizziness/lightheadedness, new onset pedal edema, n/v, f/c.  Patient denies h/o cardiac problems or tachycardia but admits to having had elevated HR in the 90 throughout pregnancy. She admits to having had increased pain today 2/2 constipation which has since resolved as she had BM this evening. Patient is stable and meeting all postpartum mile stones: ambulating, tolerating regular diet, voiding spontaneously.     Vital Signs Last 24 Hrs  T(C): 36.6 (18 Dec 2017 00:28), Max: 36.9 (17 Dec 2017 12:30)  T(F): 97.8 (18 Dec 2017 00:28), Max: 98.4 (17 Dec 2017 12:30)  HR: 110 (18 Dec 2017 00:28) (97 - 124)  BP: 114/77 (18 Dec 2017 00:28) (105/67 - 133/82)  BP(mean): --  RR: 18 (18 Dec 2017 00:28) (18 - 18)  SpO2: 97% (18 Dec 2017 00:28) (97% - 99%)    I&O's Detail    16 Dec 2017 07:01  -  17 Dec 2017 07:00  --------------------------------------------------------  IN:  Total IN: 0 mL    OUT:    Voided: 900 mL  Total OUT: 900 mL    Total NET: -900 mL          PE:  Gen: Appears comfortable  CV: Tachycardia to 104, s1s2 noted  Pulm: CTA b/l  Abd: Soft, appropriately tender, no rebound.   Ext: NT b/l    Labs:                        8.7    11.9  )-----------( 185      ( 16 Dec 2017 06:27 )             24.9       MEDICATIONS  (STANDING):  acetaminophen   Tablet. 975 milliGRAM(s) Oral every 6 hours  ascorbic acid 250 milliGRAM(s) Oral three times a day  diphtheria/tetanus/pertussis (acellular) Vaccine (ADAcel) 0.5 milliLiter(s) IntraMuscular once  docusate sodium 100 milliGRAM(s) Oral three times a day  ferrous    sulfate 325 milliGRAM(s) Oral three times a day with meals  heparin  Injectable 5000 Unit(s) SubCutaneous every 12 hours  ibuprofen  Tablet 600 milliGRAM(s) Oral every 6 hours  lactated ringers. 1000 milliLiter(s) (125 mL/Hr) IV Continuous <Continuous>  oxyCODONE    IR 5 milliGRAM(s) Oral every 3 hours  oxytocin Infusion 333.333 milliUNIT(s)/Min (1000 mL/Hr) IV Continuous <Continuous>  oxytocin Infusion 41.667 milliUNIT(s)/Min (125 mL/Hr) IV Continuous <Continuous>  oxytocin Infusion 41.667 milliUNIT(s)/Min (125 mL/Hr) IV Continuous <Continuous>  prenatal multivitamin 1 Tablet(s) Oral daily      Assessment:    Differential Dx :    Plan:               ------------------------------------------------------------------------------------------------------------- Patient is 37y  status post rLTCS under general anesthesia due to PPROM at 33+4wks with prenatal course complicated by GDMA2.     Patient seen and evaluated for tachycardia of 110 as notified by RN. On exam patient was asymptomatic and without complaints with HR palpated of 104. Denies palpitations, CP, SOB, dyspnea on exertion, dizziness/lightheadedness, new onset pedal edema, n/v, f/c.  Patient denies h/o cardiac problems or tachycardia but admits to having had elevated HR in the 90 throughout pregnancy. She admits to having had increased pain today 2/2 constipation which has since resolved as she had BM this evening. Patient is stable and meeting all postpartum mile stones: ambulating, tolerating regular diet, voiding spontaneously.     Vital Signs Last 24 Hrs  T(C): 36.6 (18 Dec 2017 00:28), Max: 36.9 (17 Dec 2017 12:30)  T(F): 97.8 (18 Dec 2017 00:28), Max: 98.4 (17 Dec 2017 12:30)  HR: 110 (18 Dec 2017 00:28) (97 - 124)  BP: 114/77 (18 Dec 2017 00:28) (105/67 - 133/82)  BP(mean): --  RR: 18 (18 Dec 2017 00:28) (18 - 18)  SpO2: 97% (18 Dec 2017 00:28) (97% - 99%)    I&O's Detail    16 Dec 2017 07:01  -  17 Dec 2017 07:00  --------------------------------------------------------  IN:  Total IN: 0 mL    OUT:    Voided: 900 mL  Total OUT: 900 mL    Total NET: -900 mL          PE:  Gen: Appears comfortable  CV: Tachycardia to 104, s1s2 noted  Pulm: CTA b/l  Abd: Soft, appropriately tender, no rebound.   Ext: NT b/l    Labs:                        8.7    11.9  )-----------( 185      ( 16 Dec 2017 06:27 )             24.9       MEDICATIONS  (STANDING):  acetaminophen   Tablet. 975 milliGRAM(s) Oral every 6 hours  ascorbic acid 250 milliGRAM(s) Oral three times a day  diphtheria/tetanus/pertussis (acellular) Vaccine (ADAcel) 0.5 milliLiter(s) IntraMuscular once  docusate sodium 100 milliGRAM(s) Oral three times a day  ferrous    sulfate 325 milliGRAM(s) Oral three times a day with meals  heparin  Injectable 5000 Unit(s) SubCutaneous every 12 hours  ibuprofen  Tablet 600 milliGRAM(s) Oral every 6 hours  lactated ringers. 1000 milliLiter(s) (125 mL/Hr) IV Continuous <Continuous>  oxyCODONE    IR 5 milliGRAM(s) Oral every 3 hours  oxytocin Infusion 333.333 milliUNIT(s)/Min (1000 mL/Hr) IV Continuous <Continuous>  oxytocin Infusion 41.667 milliUNIT(s)/Min (125 mL/Hr) IV Continuous <Continuous>  oxytocin Infusion 41.667 milliUNIT(s)/Min (125 mL/Hr) IV Continuous <Continuous>  prenatal multivitamin 1 Tablet(s) Oral daily      A/P:   37y  status post rLTCS under general anesthesia due to PPROM  with prenatal course complicated by GDMA2 and postpartum tachycardia  -Asymptomatic tachycardia postpartum 2/2 acute blood loss during delivery ddx: cardiac cause vs pain-induced tachycardia (less likely given pt's pain is well controlled and pt denies h/o of cardiac dz)   -AM CBC to trend Hct: 34.4->24.9, EBL 1200  -Vital signs stable   -Consider EKG if pt becomes symptomatic     d/w Dr. Jeff Sanders PGY1          -------------------------------------------------------------------------------------------------------------

## 2017-12-18 NOTE — LACTATION INITIAL EVALUATION - LACTATION INTERVENTIONS
initiate skin to skin/initiate hand expression routine/initiate dual electric pump routine/ BF guidelines, continue with NS soaks to both nipples after pumping for 5-10 minutes

## 2017-12-19 ENCOUNTER — TRANSCRIPTION ENCOUNTER (OUTPATIENT)
Age: 37
End: 2017-12-19

## 2017-12-19 VITALS
DIASTOLIC BLOOD PRESSURE: 87 MMHG | HEART RATE: 95 BPM | RESPIRATION RATE: 18 BRPM | SYSTOLIC BLOOD PRESSURE: 135 MMHG | TEMPERATURE: 98 F | OXYGEN SATURATION: 98 %

## 2017-12-19 PROCEDURE — 74018 RADEX ABDOMEN 1 VIEW: CPT

## 2017-12-19 PROCEDURE — 82962 GLUCOSE BLOOD TEST: CPT

## 2017-12-19 PROCEDURE — 87653 STREP B DNA AMP PROBE: CPT

## 2017-12-19 PROCEDURE — 86900 BLOOD TYPING SEROLOGIC ABO: CPT

## 2017-12-19 PROCEDURE — 59050 FETAL MONITOR W/REPORT: CPT

## 2017-12-19 PROCEDURE — 86901 BLOOD TYPING SEROLOGIC RH(D): CPT

## 2017-12-19 PROCEDURE — 85027 COMPLETE CBC AUTOMATED: CPT

## 2017-12-19 PROCEDURE — G0463: CPT

## 2017-12-19 PROCEDURE — 59025 FETAL NON-STRESS TEST: CPT

## 2017-12-19 PROCEDURE — 86850 RBC ANTIBODY SCREEN: CPT

## 2017-12-19 PROCEDURE — 86780 TREPONEMA PALLIDUM: CPT

## 2017-12-19 PROCEDURE — 83735 ASSAY OF MAGNESIUM: CPT

## 2017-12-19 RX ORDER — IBUPROFEN 200 MG
1 TABLET ORAL
Qty: 0 | Refills: 0 | COMMUNITY
Start: 2017-12-19

## 2017-12-19 RX ORDER — OXYCODONE HYDROCHLORIDE 5 MG/1
1 TABLET ORAL
Qty: 30 | Refills: 0 | OUTPATIENT
Start: 2017-12-19

## 2017-12-19 RX ORDER — SIMETHICONE 80 MG/1
1 TABLET, CHEWABLE ORAL
Qty: 0 | Refills: 0 | COMMUNITY
Start: 2017-12-19

## 2017-12-19 RX ORDER — DOCUSATE SODIUM 100 MG
1 CAPSULE ORAL
Qty: 0 | Refills: 0 | COMMUNITY
Start: 2017-12-19

## 2017-12-19 RX ORDER — ACETAMINOPHEN 500 MG
3 TABLET ORAL
Qty: 0 | Refills: 0 | COMMUNITY
Start: 2017-12-19

## 2017-12-19 RX ADMIN — Medication 600 MILLIGRAM(S): at 05:54

## 2017-12-19 RX ADMIN — Medication 975 MILLIGRAM(S): at 05:55

## 2017-12-19 RX ADMIN — Medication 600 MILLIGRAM(S): at 12:11

## 2017-12-19 RX ADMIN — Medication 1 TABLET(S): at 12:11

## 2017-12-19 RX ADMIN — Medication 250 MILLIGRAM(S): at 12:11

## 2017-12-19 RX ADMIN — Medication 975 MILLIGRAM(S): at 12:11

## 2017-12-19 RX ADMIN — Medication 600 MILLIGRAM(S): at 00:20

## 2017-12-19 RX ADMIN — Medication 975 MILLIGRAM(S): at 00:20

## 2017-12-19 NOTE — PROGRESS NOTE ADULT - SUBJECTIVE AND OBJECTIVE BOX
OB Postpartum Note:  Delivery, POD#3    S: 36yo  POD#3 s/p rLTCS for BPP2/8 and PPROM at 33.4. The patient feels well.  Pain is well controlled. She is tolerating a regular diet and passing flatus. She is voiding spontaneously, and ambulating without difficulty or dizziness. Denies CP/SOB/palpitations. Denies lightheadedness/dizziness. Denies N/V. Patient has had postpartum tachycardia for past 3 days but remains stable and without symptoms.    O:  Vitals:  Vital Signs Last 24 Hrs  T(C): 36.8 (18 Dec 2017 06:34), Max: 36.9 (17 Dec 2017 12:30)  T(F): 98.2 (18 Dec 2017 06:34), Max: 98.4 (17 Dec 2017 12:30)  HR: 100 (18 Dec 2017 06:34) (97 - 124)  BP: 115/77 (18 Dec 2017 06:34) (105/67 - 133/82)  BP(mean): --  RR: 18 (18 Dec 2017 06:34) (18 - 18)  SpO2: 100% (18 Dec 2017 06:34) (97% - 100%)    MEDICATIONS  (STANDING):  acetaminophen   Tablet. 975 milliGRAM(s) Oral every 6 hours  ascorbic acid 250 milliGRAM(s) Oral three times a day  diphtheria/tetanus/pertussis (acellular) Vaccine (ADAcel) 0.5 milliLiter(s) IntraMuscular once  docusate sodium 100 milliGRAM(s) Oral three times a day  ferrous    sulfate 325 milliGRAM(s) Oral three times a day with meals  heparin  Injectable 5000 Unit(s) SubCutaneous every 12 hours  ibuprofen  Tablet 600 milliGRAM(s) Oral every 6 hours  lactated ringers. 1000 milliLiter(s) (125 mL/Hr) IV Continuous <Continuous>  oxyCODONE    IR 5 milliGRAM(s) Oral every 3 hours  oxytocin Infusion 333.333 milliUNIT(s)/Min (1000 mL/Hr) IV Continuous <Continuous>  oxytocin Infusion 41.667 milliUNIT(s)/Min (125 mL/Hr) IV Continuous <Continuous>  oxytocin Infusion 41.667 milliUNIT(s)/Min (125 mL/Hr) IV Continuous <Continuous>  prenatal multivitamin 1 Tablet(s) Oral daily    MEDICATIONS  (PRN):  diphenhydrAMINE   Capsule 25 milliGRAM(s) Oral every 6 hours PRN Itching  glycerin Suppository - Adult 1 Suppository(s) Rectal at bedtime PRN Constipation  HYDROmorphone  Injectable 0.25 milliGRAM(s) IV Push every 10 minutes PRN Moderate Pain (4 - 6)  lanolin Ointment 1 Application(s) Topical every 3 hours PRN Sore Nipples  magnesium hydroxide Suspension 30 milliLiter(s) Oral daily PRN Constipation  oxyCODONE    IR 5 milliGRAM(s) Oral every 4 hours PRN Severe Pain (7 - 10)  simethicone 80 milliGRAM(s) Chew every 4 hours PRN Gas  sodium chloride 0.65% Nasal 1 Spray(s) Both Nostrils daily PRN Nasal Congestion      LABS:  Blood type: A Positive  Rubella IgG: RPR: Negative                          8.7<L>   11.9<H> >-----------< 185    ( 12-16 @ 06:27 )             24.9<L>        Mg     6.7<H>     12-15  Mg     6.0<H>     12-15            Physical exam:  Gen: NAD  Abdomen: Soft, nontender, no distension , firm uterine fundus  Incision: Clean, dry, and intact   Pelvic: Normal lochia noted; bruising noted on mons pubis  Ext: No calf tenderness
Day 2 of Anesthesia Pain Management Service    SUBJECTIVE: Patient seen and examined at the bedside. Doing well. No complaints.     Pain Scale Score:    [X] Refer to charted pain scores    THERAPY: Epidural Bupivacaine 0.01 % and Fentanyl 3 micrograms/mL     Demand Dose: 3 mL  Lockout: 15 minutes   Continuous Rate:  10 mL    MEDICATIONS  (STANDING):  acetaminophen   Tablet. 975 milliGRAM(s) Oral every 6 hours  diphtheria/tetanus/pertussis (acellular) Vaccine (ADAcel) 0.5 milliLiter(s) IntraMuscular once  ferrous    sulfate 325 milliGRAM(s) Oral daily  heparin  Injectable 5000 Unit(s) SubCutaneous every 12 hours  HYDROmorphone PCA (1 mG/mL) 30 milliLiter(s) PCA Continuous PCA Continuous  HYDROmorphone PCA (1 mG/mL) 30 milliLiter(s) PCA Continuous PCA Continuous  ibuprofen  Tablet 600 milliGRAM(s) Oral every 6 hours  ketorolac   Injectable 30 milliGRAM(s) IV Push every 6 hours  lactated ringers. 1000 milliLiter(s) (125 mL/Hr) IV Continuous <Continuous>  oxyCODONE    IR 5 milliGRAM(s) Oral every 3 hours  oxytocin Infusion 333.333 milliUNIT(s)/Min (1000 mL/Hr) IV Continuous <Continuous>  oxytocin Infusion 41.667 milliUNIT(s)/Min (125 mL/Hr) IV Continuous <Continuous>  oxytocin Infusion 41.667 milliUNIT(s)/Min (125 mL/Hr) IV Continuous <Continuous>  prenatal multivitamin 1 Tablet(s) Oral daily    MEDICATIONS  (PRN):  diphenhydrAMINE   Capsule 25 milliGRAM(s) Oral every 6 hours PRN Itching  docusate sodium 100 milliGRAM(s) Oral two times a day PRN Stool Softening  glycerin Suppository - Adult 1 Suppository(s) Rectal at bedtime PRN Constipation  HYDROmorphone  Injectable 0.25 milliGRAM(s) IV Push every 10 minutes PRN Moderate Pain (4 - 6)  HYDROmorphone PCA (1 mG/mL) Rescue Clinician Bolus 0.5 milliGRAM(s) IV Push every 15 minutes PRN Moderate Pain (4 - 6)  lanolin Ointment 1 Application(s) Topical every 3 hours PRN Sore Nipples  naloxone Injectable 0.1 milliGRAM(s) IV Push every 3 minutes PRN For ANY of the following changes in patient status:  A. RR LESS THAN 10 breaths per minute, B. Oxygen saturation LESS THAN 90%, C. Sedation score of 6  naloxone Injectable 0.1 milliGRAM(s) IV Push every 3 minutes PRN For ANY of the following changes in patient status:  A. RR LESS THAN 10 breaths per minute, B. Oxygen saturation LESS THAN 90%, C. Sedation score of 6  ondansetron Injectable 4 milliGRAM(s) IV Push every 6 hours PRN Nausea  ondansetron Injectable 4 milliGRAM(s) IV Push every 6 hours PRN Nausea  oxyCODONE    IR 5 milliGRAM(s) Oral every 4 hours PRN Severe Pain (7 - 10)  simethicone 80 milliGRAM(s) Chew every 4 hours PRN Gas  sodium chloride 0.65% Nasal 1 Spray(s) Both Nostrils daily PRN Nasal Congestion      OBJECTIVE:    Assessment of Epidural Catheter Site: 	    [ ] Dressing intact	[X] Site non-tender	[X] Site without erythema, discharge, edema  [ ] Epidural tubing and connection checked	[X] Gross neurological exam within normal limits  [X] Catheter removed                          8.7    11.9  )-----------( 185      ( 16 Dec 2017 06:27 )             24.9     Vital Signs Last 24 Hrs  T(C): 36.8 (12-16-17 @ 14:33), Max: 37.2 (12-15-17 @ 19:10)  T(F): 98.2 (12-16-17 @ 14:33), Max: 99 (12-15-17 @ 19:10)  HR: 77 (12-16-17 @ 14:33) (75 - 112)  BP: 100/65 (12-16-17 @ 14:33) (96/65 - 115/63)  BP(mean): 82 (12-15-17 @ 19:10) (76 - 82)  RR: 18 (12-16-17 @ 14:33) (12 - 18)  SpO2: 98% (12-16-17 @ 14:33) (94% - 98%)      Sedation Score:	[X] Alert	[ ] Drowsy	[ ] Arousable  [ ] Asleep  [ ] Unresponsive    Side Effects:	[X] None	[ ] Nausea	[ ] Vomiting  [ ] Pruritus  		[ ] Weakness  [ ] Numbness  [ ] Other:    ASSESSMENT/ PLAN:    Therapy:                         [ ] Continue   [X] Discontinue   [X] Change to PRN Analgesics    Documentation and Verification of current medications:  [X] Done	[ ] Not done, not eligible, reason:    Comments:
OB Postpartum Note: Primary  Delivery, POD#4    S: 38yo  POD#4 s/p rLTCS for BPP2/8 and PPROM at 33.4.   The patient feels well.  Pain is well controlled. She is tolerating a regular diet and passing flatus. She is voiding spontaneously, and ambulating without difficulty. Denies CP/SOB. Denies lightheadedness/dizziness. Denies N/V.    Patient has had postpartum tachycardia for past 3 days but remains stable and without symptoms.    O:  Vitals:  Vital Signs Last 24 Hrs  T(C): 36.8 (19 Dec 2017 05:10), Max: 36.8 (18 Dec 2017 13:10)  T(F): 98.2 (19 Dec 2017 05:10), Max: 98.3 (18 Dec 2017 13:10)  HR: 111 (19 Dec 2017 05:10) (92 - 111)  BP: 108/70 (19 Dec 2017 05:10) (108/70 - 128/88)  BP(mean): --  RR: 18 (19 Dec 2017 05:10) (18 - 148)  SpO2: 99% (19 Dec 2017 05:10) (98% - 99%)    MEDICATIONS  (STANDING):  acetaminophen   Tablet. 975 milliGRAM(s) Oral every 6 hours  ascorbic acid 250 milliGRAM(s) Oral three times a day  diphtheria/tetanus/pertussis (acellular) Vaccine (ADAcel) 0.5 milliLiter(s) IntraMuscular once  docusate sodium 100 milliGRAM(s) Oral three times a day  ferrous    sulfate 325 milliGRAM(s) Oral three times a day with meals  heparin  Injectable 5000 Unit(s) SubCutaneous every 12 hours  ibuprofen  Tablet 600 milliGRAM(s) Oral every 6 hours  lactated ringers. 1000 milliLiter(s) (125 mL/Hr) IV Continuous <Continuous>  oxyCODONE    IR 5 milliGRAM(s) Oral every 3 hours  oxytocin Infusion 333.333 milliUNIT(s)/Min (1000 mL/Hr) IV Continuous <Continuous>  oxytocin Infusion 41.667 milliUNIT(s)/Min (125 mL/Hr) IV Continuous <Continuous>  oxytocin Infusion 41.667 milliUNIT(s)/Min (125 mL/Hr) IV Continuous <Continuous>  prenatal multivitamin 1 Tablet(s) Oral daily    MEDICATIONS  (PRN):  diphenhydrAMINE   Capsule 25 milliGRAM(s) Oral every 6 hours PRN Itching  glycerin Suppository - Adult 1 Suppository(s) Rectal at bedtime PRN Constipation  HYDROmorphone  Injectable 0.25 milliGRAM(s) IV Push every 10 minutes PRN Moderate Pain (4 - 6)  lanolin Ointment 1 Application(s) Topical every 3 hours PRN Sore Nipples  magnesium hydroxide Suspension 30 milliLiter(s) Oral daily PRN Constipation  oxyCODONE    IR 5 milliGRAM(s) Oral every 4 hours PRN Severe Pain (7 - 10)  simethicone 80 milliGRAM(s) Chew every 4 hours PRN Gas  sodium chloride 0.65% Nasal 1 Spray(s) Both Nostrils daily PRN Nasal Congestion      LABS:  Blood type: A Positive  Rubella IgG: RPR: Negative                          8.9<L>   10.3 >-----------< 212    ( 12-18 @ 07:31 )             26.2<L>                  Physical exam:  Gen: NAD  Abdomen: Soft, nontender, no distension , firm uterine fundus at umbilicus.  Incision: Clean, dry, and intact   Pelvic: Normal lochia noted  Ext: No calf tenderness
Patient seen and examined at bedside, no acute overnight events. No acute complaints, pain well controlled. Denies any HA, blurry vision, dizziness, CP/SOB, N/V. Patient is ambulating and tolerating regular diet. Has not yet passed flatus. Sierra is still in place. Pt is pumping for baby in NICU.     Vital Signs Last 24 Hours  T(C): 36.6 (12-16-17 @ 01:00), Max: 37.2 (12-15-17 @ 19:10)  HR: 98 (12-16-17 @ 01:00) (98 - 126)  BP: 104/66 (12-16-17 @ 01:00) (101/68 - 132/63)  RR: 18 (12-16-17 @ 01:00) (11 - 18)  SpO2: 97% (12-16-17 @ 01:00) (93% - 98%)    I&O's Summary    14 Dec 2017 07:01  -  15 Dec 2017 07:00  --------------------------------------------------------  IN: 0 mL / OUT: 1200 mL / NET: -1200 mL    15 Dec 2017 07:01  -  16 Dec 2017 06:14  --------------------------------------------------------  IN: 2600 mL / OUT: 2500 mL / NET: 100 mL        Physical Exam:  General: NAD  CV: RRR  Pulm: CTAB  Abdomen: Soft, non-tender, non-distended  Incision: Pfannenstiel incision CDI, subcuticular suture closure  Pelvic: Lochia wnl; fundus firm and non-tender   Extremities: no calf tenderness noted on exam    Labs:    Blood Type: A Positive  Antibody Screen: Negative  RPR: Negative               12.3   11.5  )-----------( 188      ( 12-14 @ 20:49 )             34.4         MEDICATIONS  (STANDING):  acetaminophen   Tablet. 975 milliGRAM(s) Oral every 6 hours  diphtheria/tetanus/pertussis (acellular) Vaccine (ADAcel) 0.5 milliLiter(s) IntraMuscular once  ferrous    sulfate 325 milliGRAM(s) Oral daily  heparin  Injectable 5000 Unit(s) SubCutaneous every 12 hours  HYDROmorphone PCA (1 mG/mL) 30 milliLiter(s) PCA Continuous PCA Continuous  HYDROmorphone PCA (1 mG/mL) 30 milliLiter(s) PCA Continuous PCA Continuous  ibuprofen  Tablet 600 milliGRAM(s) Oral every 6 hours  ketorolac   Injectable 30 milliGRAM(s) IV Push every 6 hours  lactated ringers. 1000 milliLiter(s) (125 mL/Hr) IV Continuous <Continuous>  oxyCODONE    IR 5 milliGRAM(s) Oral every 3 hours  oxytocin Infusion 333.333 milliUNIT(s)/Min (1000 mL/Hr) IV Continuous <Continuous>  oxytocin Infusion 41.667 milliUNIT(s)/Min (125 mL/Hr) IV Continuous <Continuous>  oxytocin Infusion 41.667 milliUNIT(s)/Min (125 mL/Hr) IV Continuous <Continuous>  prenatal multivitamin 1 Tablet(s) Oral daily    MEDICATIONS  (PRN):  diphenhydrAMINE   Capsule 25 milliGRAM(s) Oral every 6 hours PRN Itching  docusate sodium 100 milliGRAM(s) Oral two times a day PRN Stool Softening  glycerin Suppository - Adult 1 Suppository(s) Rectal at bedtime PRN Constipation  HYDROmorphone  Injectable 0.25 milliGRAM(s) IV Push every 10 minutes PRN Moderate Pain (4 - 6)  HYDROmorphone PCA (1 mG/mL) Rescue Clinician Bolus 0.5 milliGRAM(s) IV Push every 15 minutes PRN Moderate Pain (4 - 6)  lanolin Ointment 1 Application(s) Topical every 3 hours PRN Sore Nipples  naloxone Injectable 0.1 milliGRAM(s) IV Push every 3 minutes PRN For ANY of the following changes in patient status:  A. RR LESS THAN 10 breaths per minute, B. Oxygen saturation LESS THAN 90%, C. Sedation score of 6  naloxone Injectable 0.1 milliGRAM(s) IV Push every 3 minutes PRN For ANY of the following changes in patient status:  A. RR LESS THAN 10 breaths per minute, B. Oxygen saturation LESS THAN 90%, C. Sedation score of 6  ondansetron Injectable 4 milliGRAM(s) IV Push every 6 hours PRN Nausea  ondansetron Injectable 4 milliGRAM(s) IV Push every 6 hours PRN Nausea  oxyCODONE    IR 5 milliGRAM(s) Oral every 4 hours PRN Severe Pain (7 - 10)  simethicone 80 milliGRAM(s) Chew every 4 hours PRN Gas  sodium chloride 0.65% Nasal 1 Spray(s) Both Nostrils daily PRN Nasal Congestion
Pt seen and examined at bedside. Pt states mild abdominal pain. Pt [x ] ambulating, tolerating _reg__ diet, [x ] flatus, [x ]BM, [x ] urinating adequately.   Pt denies fever, chills, chest pain, SOB, nausea, vomiting, lightheadedness, dizziness.      T(F): 98.2 (12-18-17 @ 06:34), Max: 98.4 (12-17-17 @ 12:30)  HR: 100 (12-18-17 @ 06:34) (97 - 115)  BP: 115/77 (12-18-17 @ 06:34) (105/67 - 133/82)  RR: 18 (12-18-17 @ 06:34) (18 - 18)  SpO2: 100% (12-18-17 @ 06:34) (97% - 100%)  Wt(kg): --  I&O's Summary      MEDICATIONS  (STANDING):  acetaminophen   Tablet. 975 milliGRAM(s) Oral every 6 hours  ascorbic acid 250 milliGRAM(s) Oral three times a day  diphtheria/tetanus/pertussis (acellular) Vaccine (ADAcel) 0.5 milliLiter(s) IntraMuscular once  docusate sodium 100 milliGRAM(s) Oral three times a day  ferrous    sulfate 325 milliGRAM(s) Oral three times a day with meals  heparin  Injectable 5000 Unit(s) SubCutaneous every 12 hours  ibuprofen  Tablet 600 milliGRAM(s) Oral every 6 hours  lactated ringers. 1000 milliLiter(s) (125 mL/Hr) IV Continuous <Continuous>  oxyCODONE    IR 5 milliGRAM(s) Oral every 3 hours  oxytocin Infusion 333.333 milliUNIT(s)/Min (1000 mL/Hr) IV Continuous <Continuous>  oxytocin Infusion 41.667 milliUNIT(s)/Min (125 mL/Hr) IV Continuous <Continuous>  oxytocin Infusion 41.667 milliUNIT(s)/Min (125 mL/Hr) IV Continuous <Continuous>  prenatal multivitamin 1 Tablet(s) Oral daily    MEDICATIONS  (PRN):  diphenhydrAMINE   Capsule 25 milliGRAM(s) Oral every 6 hours PRN Itching  glycerin Suppository - Adult 1 Suppository(s) Rectal at bedtime PRN Constipation  HYDROmorphone  Injectable 0.25 milliGRAM(s) IV Push every 10 minutes PRN Moderate Pain (4 - 6)  lanolin Ointment 1 Application(s) Topical every 3 hours PRN Sore Nipples  magnesium hydroxide Suspension 30 milliLiter(s) Oral daily PRN Constipation  oxyCODONE    IR 5 milliGRAM(s) Oral every 4 hours PRN Severe Pain (7 - 10)  simethicone 80 milliGRAM(s) Chew every 4 hours PRN Gas  sodium chloride 0.65% Nasal 1 Spray(s) Both Nostrils daily PRN Nasal Congestion      Physical Exam:  Constitutional: NAD  Pulmonary: clear to auscultation bilaterally   Cardiovascular: Regular rate and rhythm   Abdomen: incision site clean, dry, intact. Soft, mildly tender, [x ] distended, no guarding, no rebound, [x ] bowel sounds  Extremities: no lower extremity edema or calve tenderness. SCDs in place   Marked ecchymosis on mons and inguinal areas    LABS:                        8.9    10.3  )-----------( 212      ( 18 Dec 2017 07:31 )             26.2                 RADIOLOGY & ADDITIONAL TESTS:
S: Patient doing well. Minimal lochia. Pain controlled.    O: Vital Signs Last 24 Hrs  T(C): 36.6 (17 Dec 2017 08:22), Max: 36.8 (16 Dec 2017 10:30)  T(F): 97.9 (17 Dec 2017 08:22), Max: 98.2 (16 Dec 2017 10:30)  HR: 97 (17 Dec 2017 05:02) (75 - 101)  BP: 117/88 (17 Dec 2017 08:22) (96/65 - 122/80)  BP(mean): --  RR: 18 (17 Dec 2017 08:22) (18 - 18)  SpO2: 99% (17 Dec 2017 08:22) (98% - 99%)    Gen: NAD  Abd: soft, NT, ND, fundus firm below umbilicus  Lochia: moderate  Ext: no tenderness    Labs:                        8.7    11.9  )-----------( 185      ( 16 Dec 2017 06:27 )             24.9

## 2017-12-19 NOTE — DISCHARGE NOTE OB - PATIENT PORTAL LINK FT
“You can access the [employer] Patient Portal, offered by [facility], by registering with the following website: [email or web address]”

## 2017-12-19 NOTE — PROGRESS NOTE ADULT - PROBLEM SELECTOR PLAN 1
- Continue motrin, tylenol, oxycodone PRN for pain control.  - Increase ambulation  - Continue regular diet  - Follow up AM H/H given ebl 1200 and hct 34.4->34.9  - Continue iron/vit C/colace tid  - Discharge planning      Cynthia Sanders DO PGY1
- Continue motrin, tylenol, oxycodone PRN for pain control.  - Increase ambulation  - Continue regular diet  - Discharge planning    Josefina Goldsmith PGY-1
- Continue with PCEA  - Increase ambulation  - Continue regular diet  - Renew IV fluids  - Check CBC  - D/c Sierra  - Incision dressing removed    MERT Peralta, PGY-1

## 2017-12-19 NOTE — DISCHARGE NOTE OB - CARE PROVIDER_API CALL
Aime Seaman (MD), Obstetrics and Gynecology  [address] [place] [address]  Phone: [phone]  Fax: [phone]

## 2017-12-19 NOTE — DISCHARGE NOTE OB - MEDICATION SUMMARY - MEDICATIONS TO TAKE
I will START or STAY ON the medications listed below when I get home from the hospital:    oxyCODONE 5 mg oral tablet  -- 1 tab(s) by mouth every 3 hours, As Needed MDD:6   -- Indication: For pain    acetaminophen 325 mg oral tablet  -- 3 tab(s) by mouth every 6 hours  -- Indication: For pain    ibuprofen 600 mg oral tablet  -- 1 tab(s) by mouth every 6 hours  -- Indication: For pain    docusate sodium 100 mg oral capsule  -- 1 cap(s) by mouth 3 times a day  -- Indication: For Constipation    simethicone 80 mg oral tablet, chewable  -- 1 tab(s) by mouth every 4 hours, As needed, Gas  -- Indication: For gas

## 2017-12-19 NOTE — PROGRESS NOTE ADULT - ASSESSMENT
A/P: 36yo  POD#3 s/p rLTCS for BPP2/8 and PPROM at 33.4.  Patient is stable and is doing well post-operatively.
36y/o  POD#1 from rLTCS under general anesthesia for NRFHT and BPP 2/8 w/ PPROM at 33w4d, in stable condition.
A/P: 38yo  POD#4 s/p LTCS.  Patient is stable and is doing well post-operatively.
Will observe marked ecchymosis x additional day  H/H 8.9/26.2

## 2017-12-21 ENCOUNTER — MEDICATION RENEWAL (OUTPATIENT)
Age: 37
End: 2017-12-21

## 2017-12-21 RX ORDER — LANCETS 30 GAUGE
EACH MISCELLANEOUS
Qty: 6 | Refills: 2 | Status: ACTIVE | COMMUNITY
Start: 2017-11-07 | End: 1900-01-01

## 2017-12-22 ENCOUNTER — EMERGENCY (EMERGENCY)
Facility: HOSPITAL | Age: 37
LOS: 1 days | End: 2017-12-22
Attending: EMERGENCY MEDICINE | Admitting: EMERGENCY MEDICINE
Payer: COMMERCIAL

## 2017-12-22 VITALS
RESPIRATION RATE: 17 BRPM | DIASTOLIC BLOOD PRESSURE: 82 MMHG | TEMPERATURE: 98 F | OXYGEN SATURATION: 99 % | HEART RATE: 112 BPM | SYSTOLIC BLOOD PRESSURE: 142 MMHG

## 2017-12-22 LAB
ALBUMIN SERPL ELPH-MCNC: 3.4 G/DL — SIGNIFICANT CHANGE UP (ref 3.3–5)
ALP SERPL-CCNC: 75 U/L — SIGNIFICANT CHANGE UP (ref 40–120)
ALT FLD-CCNC: 21 U/L RC — SIGNIFICANT CHANGE UP (ref 10–45)
ANION GAP SERPL CALC-SCNC: 14 MMOL/L — SIGNIFICANT CHANGE UP (ref 5–17)
APPEARANCE UR: CLEAR — SIGNIFICANT CHANGE UP
AST SERPL-CCNC: 18 U/L — SIGNIFICANT CHANGE UP (ref 10–40)
BASOPHILS # BLD AUTO: 0.1 K/UL — SIGNIFICANT CHANGE UP (ref 0–0.2)
BASOPHILS NFR BLD AUTO: 0.6 % — SIGNIFICANT CHANGE UP (ref 0–2)
BILIRUB SERPL-MCNC: 0.2 MG/DL — SIGNIFICANT CHANGE UP (ref 0.2–1.2)
BILIRUB UR-MCNC: NEGATIVE — SIGNIFICANT CHANGE UP
BUN SERPL-MCNC: 14 MG/DL — SIGNIFICANT CHANGE UP (ref 7–23)
CALCIUM SERPL-MCNC: 9.4 MG/DL — SIGNIFICANT CHANGE UP (ref 8.4–10.5)
CHLORIDE SERPL-SCNC: 103 MMOL/L — SIGNIFICANT CHANGE UP (ref 96–108)
CO2 SERPL-SCNC: 23 MMOL/L — SIGNIFICANT CHANGE UP (ref 22–31)
COLOR SPEC: SIGNIFICANT CHANGE UP
CREAT SERPL-MCNC: 0.54 MG/DL — SIGNIFICANT CHANGE UP (ref 0.5–1.3)
DIFF PNL FLD: NEGATIVE — SIGNIFICANT CHANGE UP
EOSINOPHIL # BLD AUTO: 0.3 K/UL — SIGNIFICANT CHANGE UP (ref 0–0.5)
EOSINOPHIL NFR BLD AUTO: 3.1 % — SIGNIFICANT CHANGE UP (ref 0–6)
GLUCOSE SERPL-MCNC: 121 MG/DL — HIGH (ref 70–99)
GLUCOSE UR QL: NEGATIVE — SIGNIFICANT CHANGE UP
HCT VFR BLD CALC: 31.2 % — LOW (ref 34.5–45)
HGB BLD-MCNC: 10.9 G/DL — LOW (ref 11.5–15.5)
KETONES UR-MCNC: NEGATIVE — SIGNIFICANT CHANGE UP
LDH SERPL L TO P-CCNC: 185 U/L — SIGNIFICANT CHANGE UP (ref 50–242)
LEUKOCYTE ESTERASE UR-ACNC: NEGATIVE — SIGNIFICANT CHANGE UP
LYMPHOCYTES # BLD AUTO: 2.3 K/UL — SIGNIFICANT CHANGE UP (ref 1–3.3)
LYMPHOCYTES # BLD AUTO: 24.8 % — SIGNIFICANT CHANGE UP (ref 13–44)
MAGNESIUM SERPL-MCNC: 1.8 MG/DL — SIGNIFICANT CHANGE UP (ref 1.6–2.6)
MCHC RBC-ENTMCNC: 33.3 PG — SIGNIFICANT CHANGE UP (ref 27–34)
MCHC RBC-ENTMCNC: 35 GM/DL — SIGNIFICANT CHANGE UP (ref 32–36)
MCV RBC AUTO: 95.3 FL — SIGNIFICANT CHANGE UP (ref 80–100)
MONOCYTES # BLD AUTO: 0.6 K/UL — SIGNIFICANT CHANGE UP (ref 0–0.9)
MONOCYTES NFR BLD AUTO: 6.7 % — SIGNIFICANT CHANGE UP (ref 2–14)
NEUTROPHILS # BLD AUTO: 6 K/UL — SIGNIFICANT CHANGE UP (ref 1.8–7.4)
NEUTROPHILS NFR BLD AUTO: 64.7 % — SIGNIFICANT CHANGE UP (ref 43–77)
NITRITE UR-MCNC: NEGATIVE — SIGNIFICANT CHANGE UP
PH UR: 6.5 — SIGNIFICANT CHANGE UP (ref 5–8)
PHOSPHATE SERPL-MCNC: 3 MG/DL — SIGNIFICANT CHANGE UP (ref 2.5–4.5)
PLATELET # BLD AUTO: 317 K/UL — SIGNIFICANT CHANGE UP (ref 150–400)
POTASSIUM SERPL-MCNC: 3.8 MMOL/L — SIGNIFICANT CHANGE UP (ref 3.5–5.3)
POTASSIUM SERPL-SCNC: 3.8 MMOL/L — SIGNIFICANT CHANGE UP (ref 3.5–5.3)
PROT SERPL-MCNC: 6.5 G/DL — SIGNIFICANT CHANGE UP (ref 6–8.3)
PROT UR-MCNC: NEGATIVE — SIGNIFICANT CHANGE UP
RBC # BLD: 3.27 M/UL — LOW (ref 3.8–5.2)
RBC # FLD: 12.5 % — SIGNIFICANT CHANGE UP (ref 10.3–14.5)
SODIUM SERPL-SCNC: 140 MMOL/L — SIGNIFICANT CHANGE UP (ref 135–145)
SP GR SPEC: 1.01 — LOW (ref 1.01–1.02)
URATE SERPL-MCNC: 5 MG/DL — SIGNIFICANT CHANGE UP (ref 2.5–7)
UROBILINOGEN FLD QL: NEGATIVE — SIGNIFICANT CHANGE UP
WBC # BLD: 9.3 K/UL — SIGNIFICANT CHANGE UP (ref 3.8–10.5)
WBC # FLD AUTO: 9.3 K/UL — SIGNIFICANT CHANGE UP (ref 3.8–10.5)

## 2017-12-22 PROCEDURE — 80053 COMPREHEN METABOLIC PANEL: CPT

## 2017-12-22 PROCEDURE — 84550 ASSAY OF BLOOD/URIC ACID: CPT

## 2017-12-22 PROCEDURE — 85384 FIBRINOGEN ACTIVITY: CPT

## 2017-12-22 PROCEDURE — 83735 ASSAY OF MAGNESIUM: CPT

## 2017-12-22 PROCEDURE — 85027 COMPLETE CBC AUTOMATED: CPT

## 2017-12-22 PROCEDURE — 84100 ASSAY OF PHOSPHORUS: CPT

## 2017-12-22 PROCEDURE — 83615 LACTATE (LD) (LDH) ENZYME: CPT

## 2017-12-22 PROCEDURE — 81003 URINALYSIS AUTO W/O SCOPE: CPT

## 2017-12-22 PROCEDURE — 99283 EMERGENCY DEPT VISIT LOW MDM: CPT

## 2017-12-22 PROCEDURE — 99284 EMERGENCY DEPT VISIT MOD MDM: CPT

## 2017-12-22 NOTE — ED PROVIDER NOTE - PHYSICAL EXAMINATION
Zo Jason M.D.:   patient awake alert seen lying on stretcher NAD .   LUNGS CTAB no wheeze no crackle.   CARD RRR no m/r/g.    Abdomen soft NT ND no rebound no guarding no CVA tenderness.   EXT WWP no edema no calf tenderness CV 2+DP/PT bilaterally.   neuro A&Ox3 gait normal.    skin warm and dry no rash  HEENT: moist mucous membranes, PERRL, EOMI

## 2017-12-22 NOTE — ED PROVIDER NOTE - ATTENDING CONTRIBUTION TO CARE
Patient PPD 7 presenting with hypertension at home.  Reported feeling "unwell" earlier in the day and reporting significant stress due to baby currently being in NICU, however specifically denying headaches, vision changes, numbness, tingling and weakness, chest pains, shortness of breath.  Now reporting feeling better, but due to elevated BP at home wanted to get evaluated.    On exam patient well appearing, mildly hypertensive but otherwise within normal limits, RRR S1/S2, lungs clear to ascultation bilaterally, abdomen soft, non tender, non distended, normal neurologic exam.    Patient hypertensive but below threshold for BP management or starting magnesium treatment for post partum pre-ecclampsia, plan for serial BP checks, screening labs for HELLP, OBGYN consultation.

## 2017-12-22 NOTE — ED PROVIDER NOTE - OBJECTIVE STATEMENT
Zo Jason M.D: 37F  7days postpartum presenting with elevated BPs. at home bp 160s/100s, in triage 140s/100s. noted some edema in the lower extremity which is better today as compared to yesterday. no HA no visual chagnes no cp sob. pt has been very anxious this week as her daughter is in the NICU  OB: Mickey Seaman

## 2017-12-22 NOTE — ED PROVIDER NOTE - CHIEF COMPLAINT
The patient is a 37y Female complaining of hypotension. The patient is a 37y Female complaining of hypertension.

## 2017-12-22 NOTE — ED ADULT NURSE NOTE - OBJECTIVE STATEMENT
37 year old female alert and oriented x 4 came to the ED accompanied by family c/o HTN at home one week s/p .  Patient is  and delivered at 33 weeks via  without incident.  Patient's baby is in the NICU and she has a lot of stress related to it.  Patient had a lot of fatigue today and said she didn't feel well so she decided to check her BP.  Patient checked her BP at home and it was approx 160/106 via electronic cuff.  Patient denies increased extremity swelling, shortness of breath, chest pain, change in vision, headache.  Patient able to move all 4 extremities with pulses in all 4 extremities.  Patient said she had some tingling in her legs lasting only a few seconds while in the waiting, room.  Patient's  scar is CDI and no drainage noted.  IV established in right AC #20 and placed on CM in SR and safety ensured.

## 2017-12-23 VITALS
HEART RATE: 88 BPM | DIASTOLIC BLOOD PRESSURE: 89 MMHG | TEMPERATURE: 98 F | OXYGEN SATURATION: 100 % | SYSTOLIC BLOOD PRESSURE: 124 MMHG | RESPIRATION RATE: 16 BRPM

## 2017-12-23 DIAGNOSIS — I10 ESSENTIAL (PRIMARY) HYPERTENSION: ICD-10-CM

## 2017-12-23 NOTE — CONSULT NOTE ADULT - PROBLEM SELECTOR RECOMMENDATION 9
-BPs wnl over last several hrs, during evaluation  -HELLP labs wnl - low suspicion of sPEC at this time  -pt counseled on relaxation exercises, support provided  -pt counseled to call obgyn/return to ED if BPs continue to be elevated and/or if she has a HA not relieved with medication, new onset abdominal pain, blurry vision, SOB, or other such worrisome symptoms  -pt for follow up with obgyn next week    d/w Dr. Mickey Gann, pgy2

## 2017-12-23 NOTE — CONSULT NOTE ADULT - SUBJECTIVE AND OBJECTIVE BOX
HPI: 37y  s/p emergency rLTCS on 12/15 presents with elevated BPs at home. Pt notes that she was feeling "off" throughout the day though she denies any SOB, CP, n/v, fever/chills, changes in vision, HA, abdominal pain. Of note, pt states that she was stressed today due to issues with her  in NICU. Pt also voices feelings of guilt for not being able to spend more time with her other child. She notes that she is tired because of the breast pumping and that she has not been sleeping well. She denies other complaints at this time.    OB/GYN HISTORY:   Name of GYN Physician: Dr. Arevalo     OBGYN: bicornuate uterus, 1 MAB, C/S x 2, h/o LSC ovarian cystectomy, denies fibroids, STIs, abn PAP  PMH: denies  PSH: D&C, C/S x 2  Meds: tylenol, motrin, PNV  Allergies: sulfa   Social: denies x 3    ROS wnl, except as per HPI    Vital Signs Last 24 Hrs  T(C): 36.9 (22 Dec 2017 23:22), Max: 36.9 (22 Dec 2017 23:22)  T(F): 98.4 (22 Dec 2017 23:22), Max: 98.4 (22 Dec 2017 23:22)  HR: 102 (22 Dec 2017 23:53) (95 - 112)  BP: 116/86 (22 Dec 2017 23:53) (112/93 - 142/82)  RR: 16 (22 Dec 2017 23:22) (16 - 17)  SpO2: 97% (22 Dec 2017 23:22) (97% - 99%)    Physical Exam:  Gen:AAOx3, NAD  CV: RRR  Pulm:CTAB/L  Abd: soft, NT, ND  Incision: Pfannenstiel incision - steri strips in place; no surrounding edema/erythema, no drainage  Ext: NTB/L    LABS:                        10.9   9.3   )-----------( 317      ( 22 Dec 2017 22:43 )             31.2     -    140  |  103  |  14  ----------------------------<  121<H>  3.8   |  23  |  0.54    Ca    9.4      22 Dec 2017 22:43  Phos  3.0       Mg     1.8         TPro  6.5  /  Alb  3.4  /  TBili  0.2  /  DBili  x   /  AST  18  /  ALT  21  /  AlkPhos  75        Urinalysis Basic - ( 22 Dec 2017 22:43 )    Color: PL Yellow / Appearance: Clear / S.008 / pH: x  Gluc: x / Ketone: Negative  / Bili: Negative / Urobili: Negative   Blood: x / Protein: Negative / Nitrite: Negative   Leuk Esterase: Negative / RBC: x / WBC x   Sq Epi: x / Non Sq Epi: x / Bacteria: x

## 2017-12-23 NOTE — CONSULT NOTE ADULT - ASSESSMENT
37  s/p emergent rLTCS on 12/15 presents for elevated BPs at home, now resolved, HELLP labs wnl, asymptomatic.

## 2017-12-25 LAB — SURGICAL PATHOLOGY STUDY: SIGNIFICANT CHANGE UP

## 2018-01-29 ENCOUNTER — TRANSCRIPTION ENCOUNTER (OUTPATIENT)
Age: 38
End: 2018-01-29

## 2019-06-03 NOTE — ED ADULT NURSE NOTE - DOES PATIENT HAVE ADVANCE DIRECTIVE
[facility]  System  Physical Exam  General   ROS      PROGRESS  REPORT    Progress reporting period is from 4/1/2019  to 5/31/2019 .       SUBJECTIVE  Subjective: Pt did not feel that extension was helpful.  She did try some flexion and unsure if this is helpful. Continues to have intermittent pain in L hip and unsure of pattern of pain or if she knows what is triggering her pain. Pt reports incontinence is much improved.     Initial Pain level: 3/10.   Changes in function:  Yes (See Goal flowsheet attached for changes in current functional level)  Adverse reaction to treatment or activity: None    OBJECTIVE  Changes noted in objective findings:  Yes, pt with improved pelvic floor symtpoms. Continues to have SI joint/L hip pain intermittently   Objective: Holding on extension with trial of flexion-based program, progressed HEP as able. Encourged pt to return to [facility] to discuss ongoing L hip symtpoms.      ASSESSMENT/PLAN  Updated problem list and treatment plan: Diagnosis 1:  SI joint dysfunction, urinary incontinence   Pain -  hot/cold therapy, mechanical traction, manual therapy, self management, education, directional preference exercise and home program  Decreased strength - therapeutic exercise, therapeutic activities and home program  Impaired muscle performance - neuro re-education and home program  STG/LTGs have been met or progress has been made towards goals:  Yes (See Goal flow sheet completed today.)  Assessment of Progress: The patient's condition has potential to improve.  Self Management Plans:  Patient has been instructed in a home treatment program.  I have re-evaluated this patient and find that the nature, scope, duration and intensity of the therapy is appropriate for the medical condition of the patient.  Namita continues to require the following intervention to meet STG and LTG's:  PT    Recommendations:  This patient would benefit from continued therapy.     Frequency:  2 X a month, once  daily  Duration:  for 1 months        Please refer to the daily flowsheet for treatment today, total treatment time and time spent performing 1:1 timed codes.           Yes

## 2023-05-30 ENCOUNTER — RESULT REVIEW (OUTPATIENT)
Age: 43
End: 2023-05-30

## 2024-06-05 ENCOUNTER — RESULT REVIEW (OUTPATIENT)
Age: 44
End: 2024-06-05

## 2024-06-16 ENCOUNTER — TRANSCRIPTION ENCOUNTER (OUTPATIENT)
Age: 44
End: 2024-06-16

## 2025-04-21 ENCOUNTER — NON-APPOINTMENT (OUTPATIENT)
Age: 45
End: 2025-04-21

## 2025-04-21 ENCOUNTER — APPOINTMENT (OUTPATIENT)
Age: 45
End: 2025-04-21
Payer: COMMERCIAL

## 2025-04-21 PROCEDURE — 92004 COMPRE OPH EXAM NEW PT 1/>: CPT

## 2025-07-15 ENCOUNTER — RESULT REVIEW (OUTPATIENT)
Age: 45
End: 2025-07-15